# Patient Record
Sex: MALE | Race: AMERICAN INDIAN OR ALASKA NATIVE
[De-identification: names, ages, dates, MRNs, and addresses within clinical notes are randomized per-mention and may not be internally consistent; named-entity substitution may affect disease eponyms.]

---

## 2018-07-14 ENCOUNTER — HOSPITAL ENCOUNTER (EMERGENCY)
Dept: HOSPITAL 14 - H.ER | Age: 33
Discharge: HOME | End: 2018-07-14
Payer: COMMERCIAL

## 2018-07-14 VITALS — RESPIRATION RATE: 20 BRPM

## 2018-07-14 VITALS — BODY MASS INDEX: 36.1 KG/M2

## 2018-07-14 VITALS
OXYGEN SATURATION: 100 % | TEMPERATURE: 97.9 F | SYSTOLIC BLOOD PRESSURE: 127 MMHG | DIASTOLIC BLOOD PRESSURE: 93 MMHG | HEART RATE: 86 BPM

## 2018-07-14 DIAGNOSIS — S83.92XA: Primary | ICD-10-CM
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
## 2018-07-14 DIAGNOSIS — S86.912A: ICD-10-CM

## 2018-07-14 DIAGNOSIS — S93.402A: ICD-10-CM

## 2018-07-14 NOTE — ED PDOC
Lower Extremity Pain/Injury


Time Seen by Provider: 07/14/18 10:27


Chief Complaint (Nursing): Lower Extremity Problem/Injury


Chief Complaint (Provider): Left foot and knee pain, twisted 


History Per: Patient


History/Exam Limitations: no limitations


Onset/Duration Of Symptoms: Mins


Current Symptoms Are (Timing): Still Present


Additional Complaint(s): 





34 yo male with no medical problems presents with left ankle and knee pain. Pt 

states he is a  and when getting out of train. Pt states the 

steps on train in the [place] station [place] not even with the platform. Pt states 

he believes he missed a step resulting in his twisting the left knee and ankle. 

Pt reports pain in knee with bending and pain in ankle with weight bearing. 





Past Medical History


Reviewed: Historical Data, Nursing Documentation, Vital Signs


Vital Signs: 





 Last Vital Signs











Temp  97.9 F   07/14/18 10:28


 


Pulse  86   07/14/18 10:28


 


Resp      


 


BP  127/93 H  07/14/18 10:28


 


Pulse Ox  100   07/14/18 10:28














- Medical History


PMH: No Chronic Diseases





- Surgical History


Surgical History: No Surg Hx





- Family History


Family History: States: No Known Family Hx





- Home Medications


Home Medications: 


 Ambulatory Orders











 Medication  Instructions  Recorded


 


Ibuprofen [Motrin Tab] 800 mg PO Q6H PRN #20 tab 07/14/18














- Allergies


Allergies/Adverse Reactions: 


 Allergies











Allergy/AdvReac Type Severity Reaction Status Date / Time


 


No Known Allergies Allergy   Verified 07/14/18 10:27














Review of Systems


ROS Statement: Except As Marked, All Systems Reviewed And Found Negative


Constitutional: Negative for: Fever, Chills


Musculoskeletal: Positive for: Leg Pain, Foot Pain


Skin: Negative for: Bruising





Physical Exam





- Reviewed


Nursing Documentation Reviewed: Yes


Vital Signs Reviewed: Yes





- Physical Exam


Appears: Positive for: Well, Non-toxic, No Acute Distress


Head Exam: Positive for: ATRAUMATIC, NORMAL INSPECTION, NORMOCEPHALIC


Skin: Positive for: Normal Color (No erythema, no ecchymosis ), Warm


Eye Exam: Positive for: Normal appearance


ENT: Positive for: Normal ENT Inspection


Neck: Positive for: Normal


Respiratory: Negative for: Accessory Muscle Use, Respiratory Distress


Back: Positive for: Normal Inspection


Extremity: Positive for: Normal ROM (Pain with left knee flexion), Tenderness (

Left knee non-tender, (+) tenderness inferior to the lateral malleolous on the 

left ).  Negative for: Deformity, Swelling


Neurologic/Psych: Positive for: Alert, Oriented





- ECG


O2 Sat by Pulse Oximetry: 100





Medical Decision Making


Medical Decision Making: 





Knee XR and ankle XR - without acute fracture or dislocation. 





Disposition





- Clinical Impression


Clinical Impression: 


 Knee sprain, Ankle sprain and strain








- Patient ED Disposition


Is Patient to be Admitted: No





- Disposition


Referrals: 


Non [facility] Provider, [Primary Care Provider] - 


Nixon Moss III, MD [Staff Provider] - 


Disposition: Routine/Home


Disposition Time: 11:57


Condition: GOOD


Additional Instructions: 


Ice, elevation, motrin for pain. 


Prescriptions: 


Ibuprofen [Motrin Tab] 800 mg PO Q6H PRN #20 tab


 PRN Reason: Pain


Instructions:  Ankle Sprain, Knee Sprain (DC)


Forms:  [facility] (English)

## 2018-07-14 NOTE — RAD
Date of service: [facility] 



07/14/2018



PROCEDURE:  Left Knee Radiographs.



HISTORY:

Pain.



COMPARISON:

None.



FINDINGS:



BONES:

Normal. No fracture. 



JOINTS:

Normal. No osteoarthritis. 



JOINT EFFUSION:

There appears to be a small suprapatellar joint effusion 



OTHER FINDINGS:

None.



IMPRESSION:

No evidence of acute displaced fracture nor dislocation.  Small 

suprapatellar joint effusion

## 2018-07-14 NOTE — RAD
Date of service: 



07/14/2018



PROCEDURE:  Left Foot Radiographs.



HISTORY:

Tenderness inferior to the lateral malleolus



COMPARISON:

None.



FINDINGS:



BONES:

No evidence of acute displaced fracture nor dislocation.  The there 

appears to be slight talar beaking. . 



JOINTS:

Mild hallux valgus deformity with slight overgrowth of the head of 

the 1st metatarsal and mild DJD 1st MTP joint. 



SOFT TISSUES:

Normal. 



OTHER FINDINGS:

None.



IMPRESSION:

. No evidence of acute displaced fracture nor dislocation. Slight 

talar beaking.